# Patient Record
Sex: FEMALE | Race: WHITE | NOT HISPANIC OR LATINO | Employment: OTHER | URBAN - METROPOLITAN AREA
[De-identification: names, ages, dates, MRNs, and addresses within clinical notes are randomized per-mention and may not be internally consistent; named-entity substitution may affect disease eponyms.]

---

## 2017-05-24 ENCOUNTER — OFFICE VISIT (OUTPATIENT)
Dept: OBSTETRICS AND GYNECOLOGY | Facility: CLINIC | Age: 72
End: 2017-05-24

## 2017-05-24 VITALS
DIASTOLIC BLOOD PRESSURE: 78 MMHG | BODY MASS INDEX: 22.57 KG/M2 | SYSTOLIC BLOOD PRESSURE: 124 MMHG | WEIGHT: 132.2 LBS | HEIGHT: 64 IN

## 2017-05-24 DIAGNOSIS — Z01.419 WELL FEMALE EXAM WITH ROUTINE GYNECOLOGICAL EXAM: ICD-10-CM

## 2017-05-24 DIAGNOSIS — Z78.0 POSTMENOPAUSAL: Primary | ICD-10-CM

## 2017-05-24 DIAGNOSIS — N95.2 ATROPHIC VAGINITIS: ICD-10-CM

## 2017-05-24 PROCEDURE — 99397 PER PM REEVAL EST PAT 65+ YR: CPT | Performed by: OBSTETRICS & GYNECOLOGY

## 2017-05-26 LAB
CONV .: NORMAL
CYTOLOGIST CVX/VAG CYTO: NORMAL
CYTOLOGY CVX/VAG DOC THIN PREP: NORMAL
DX ICD CODE: NORMAL
HIV 1 & 2 AB SER-IMP: NORMAL
OTHER STN SPEC: NORMAL
PATH REPORT.FINAL DX SPEC: NORMAL
STAT OF ADQ CVX/VAG CYTO-IMP: NORMAL

## 2019-06-13 ENCOUNTER — OFFICE VISIT (OUTPATIENT)
Dept: INTERNAL MEDICINE | Facility: CLINIC | Age: 74
End: 2019-06-13

## 2019-06-13 VITALS
BODY MASS INDEX: 22.93 KG/M2 | HEIGHT: 64 IN | TEMPERATURE: 97.7 F | SYSTOLIC BLOOD PRESSURE: 118 MMHG | HEART RATE: 79 BPM | OXYGEN SATURATION: 98 % | WEIGHT: 134.3 LBS | DIASTOLIC BLOOD PRESSURE: 78 MMHG

## 2019-06-13 DIAGNOSIS — Z12.11 SCREENING FOR COLON CANCER: ICD-10-CM

## 2019-06-13 DIAGNOSIS — E78.5 HYPERLIPIDEMIA, UNSPECIFIED HYPERLIPIDEMIA TYPE: ICD-10-CM

## 2019-06-13 DIAGNOSIS — Z00.00 HEALTHCARE MAINTENANCE: Primary | ICD-10-CM

## 2019-06-13 DIAGNOSIS — R73.9 ELEVATED BLOOD SUGAR: ICD-10-CM

## 2019-06-13 PROBLEM — R42 VERTIGO: Status: ACTIVE | Noted: 2019-06-13

## 2019-06-13 PROBLEM — N39.0 URINARY TRACT INFECTION: Status: ACTIVE | Noted: 2019-06-13

## 2019-06-13 PROBLEM — R30.0 DYSURIA: Status: ACTIVE | Noted: 2019-06-13

## 2019-06-13 PROBLEM — J30.9 ATOPIC RHINITIS: Status: ACTIVE | Noted: 2019-06-13

## 2019-06-13 PROCEDURE — G0439 PPPS, SUBSEQ VISIT: HCPCS | Performed by: NURSE PRACTITIONER

## 2019-06-13 RX ORDER — CETIRIZINE HYDROCHLORIDE 10 MG/1
10 TABLET ORAL DAILY
COMMUNITY

## 2019-06-13 RX ORDER — PRAVASTATIN SODIUM 40 MG
TABLET ORAL
COMMUNITY
Start: 2019-06-03 | End: 2019-06-13 | Stop reason: SDUPTHER

## 2019-06-13 RX ORDER — PRAVASTATIN SODIUM 40 MG
40 TABLET ORAL NIGHTLY
Qty: 90 TABLET | Refills: 3 | Status: SHIPPED | OUTPATIENT
Start: 2019-06-13

## 2019-06-13 RX ORDER — ANTACID TABLETS 500 MG/1
600 TABLET, CHEWABLE ORAL DAILY
COMMUNITY

## 2019-06-13 NOTE — PROGRESS NOTES
"Subjective   Courtney España is a 73 y.o. female and is here for a comprehensive physical exam. New patient here to establish care.  Health history and questionnaire have been reviewed in its entirety. The patient's previous primary care provider was Dr. Cueva in 2015 and then a provider at [facility].  Her  live in [place] 6 months out of the year, so she does have a physician in [place] also.  The patient reports no problems.    Patient has a history of hyperlipidemia.  She is currently on pravastatin 40 mg and takes that about 3 days a week.  She has had side effects from other statins in the past, including Crestor.  Taking pravastatin 3 days a week does not seem to cause her any muscle aches.  She and her  are also going to be starting a plant-based diet to help improve her cholesterol.    Do you take any herbs or supplements that were not prescribed by a doctor? calcium and multivitamin     History:  Patient see gynecology and is up to date with pap and mammogram.     The following portions of the patient's history were reviewed and updated as appropriate: allergies, current medications, past family history, past medical history, past social history, past surgical history and problem list.    Review of Systems  Do you have pain that bothers you in your daily life? no  A comprehensive review of systems was negative.    Objective   /78 (BP Location: Left arm, Patient Position: Sitting, Cuff Size: Adult)   Pulse 79   Temp 97.7 °F (36.5 °C) (Oral)   Ht 162.6 cm (64\")   Wt 60.9 kg (134 lb 4.8 oz)   SpO2 98%   BMI 23.05 kg/m²     General Appearance:    Alert, cooperative, no distress, appears stated age   Head:    Normocephalic, without obvious abnormality, atraumatic   Eyes:    PERRL, conjunctiva/corneas clear, EOM's intact, both eyes   Ears:    Normal TM's and external ear canals, both ears   Nose:   Nares normal, septum midline, mucosa normal, no drainage    or sinus tenderness   Throat:   " Lips, mucosa, and tongue normal; teeth and gums normal   Neck:   Supple, symmetrical, trachea midline, no adenopathy;     thyroid:  no enlargement/tenderness/nodules; no carotid    bruit   Back:     Symmetric, no curvature, ROM normal, no CVA tenderness   Lungs:     Clear to auscultation bilaterally, respirations unlabored   Chest Wall:    No tenderness or deformity    Heart:    Regular rate and rhythm, S1 and S2 normal, no murmur       Abdomen:     Soft, non-tender, bowel sounds active all four quadrants,     no masses, no organomegaly           Extremities:   Extremities normal, atraumatic, no cyanosis or edema   Pulses:   2+ and symmetric all extremities   Skin:   Skin color, texture, turgor normal, no rashes or lesions   Lymph nodes:   Cervical, supraclavicular, and axillary nodes normal   Neurologic:   Grossly intact, normal strength, sensation and reflexes     throughout     ECG 12 Lead  Date/Time: 6/13/2019 9:15 AM  Performed by: Delmi Beasley APRN  Authorized by: Delmi Beasley APRN   Comparison: not compared with previous ECG   Previous ECG: no previous ECG available  Rhythm: sinus rhythm  Rate: normal  BPM: 81  Conduction: conduction normal  ST Segments: ST segments normal  T Waves: T waves normal  QRS axis: normal  Other: no other findings    Clinical impression: normal ECG  Comments: CA interval 180 ms  QRS interval 84 ms  QTc interval 432 ms                 Assessment/Plan   Healthy female exam.      1Bruce Valdez was seen today for annual exam.    Diagnoses and all orders for this visit:    Healthcare maintenance  -     ECG 12 Lead    Screening for colon cancer  -     Ambulatory Referral For Screening Colonoscopy    Hyperlipidemia, unspecified hyperlipidemia type  -     Comprehensive Metabolic Panel  -     Lipid Panel With / Chol / HDL Ratio  -     ECG 12 Lead  -     pravastatin (PRAVACHOL) 40 MG tablet; Take 1 tablet by mouth Every Night.    Elevated blood sugar  -     Comprehensive Metabolic  Panel  -     Hemoglobin A1c    Will call patient with lab results. She has had an elevated glucose in the past, so will check HgbA1C.    2. Patient Counseling:  --Nutrition: Stressed importance of moderation in sodium/caffeine intake, saturated fat and cholesterol, caloric balance, sufficient intake of fresh fruits, vegetables, fiber, calcium, iron. Starting plant based diet.  --Exercise: Stressed the importance of regular exercise. Walks twice a week; [facility] to [employer]  --Injury prevention: Discussed safety belts, safety helmets, smoke detector.   --Dental health: Discussed importance of regular tooth brushing, flossing, and dental visits.  --Immunizations reviewed.    3. Discussed the patient's BMI with her.  The BMI is in the acceptable range  4. Follow up next physical in 1 year

## 2019-06-14 LAB
ALBUMIN SERPL-MCNC: 4.8 G/DL (ref 3.5–5.2)
ALBUMIN/GLOB SERPL: 1.9 G/DL
ALP SERPL-CCNC: 64 U/L (ref 39–117)
ALT SERPL-CCNC: 16 U/L (ref 1–33)
AST SERPL-CCNC: 21 U/L (ref 1–32)
BILIRUB SERPL-MCNC: 0.6 MG/DL (ref 0.2–1.2)
BUN SERPL-MCNC: 12 MG/DL (ref 8–23)
BUN/CREAT SERPL: 16.9 (ref 7–25)
CALCIUM SERPL-MCNC: 9.8 MG/DL (ref 8.6–10.5)
CHLORIDE SERPL-SCNC: 101 MMOL/L (ref 98–107)
CHOLEST SERPL-MCNC: 218 MG/DL (ref 0–200)
CHOLEST/HDLC SERPL: 4.45 {RATIO}
CO2 SERPL-SCNC: 28.9 MMOL/L (ref 22–29)
CREAT SERPL-MCNC: 0.71 MG/DL (ref 0.57–1)
GLOBULIN SER CALC-MCNC: 2.5 GM/DL
GLUCOSE SERPL-MCNC: 90 MG/DL (ref 65–99)
HBA1C MFR BLD: 5.5 % (ref 4.8–5.6)
HDLC SERPL-MCNC: 49 MG/DL (ref 40–60)
LDLC SERPL CALC-MCNC: 118 MG/DL (ref 0–100)
POTASSIUM SERPL-SCNC: 4.6 MMOL/L (ref 3.5–5.2)
PROT SERPL-MCNC: 7.3 G/DL (ref 6–8.5)
SODIUM SERPL-SCNC: 142 MMOL/L (ref 136–145)
TRIGL SERPL-MCNC: 253 MG/DL (ref 0–150)
VLDLC SERPL CALC-MCNC: 50.6 MG/DL

## 2019-06-25 ENCOUNTER — PREP FOR SURGERY (OUTPATIENT)
Dept: OTHER | Facility: HOSPITAL | Age: 74
End: 2019-06-25

## 2019-06-25 DIAGNOSIS — Z80.0 FAMILY HISTORY OF COLON CANCER: Primary | ICD-10-CM

## 2019-07-11 ENCOUNTER — OFFICE VISIT (OUTPATIENT)
Dept: OBSTETRICS AND GYNECOLOGY | Facility: CLINIC | Age: 74
End: 2019-07-11

## 2019-07-11 VITALS
DIASTOLIC BLOOD PRESSURE: 70 MMHG | WEIGHT: 132 LBS | HEIGHT: 64 IN | BODY MASS INDEX: 22.53 KG/M2 | SYSTOLIC BLOOD PRESSURE: 118 MMHG

## 2019-07-11 DIAGNOSIS — Z11.51 SPECIAL SCREENING EXAMINATION FOR HUMAN PAPILLOMAVIRUS (HPV): ICD-10-CM

## 2019-07-11 DIAGNOSIS — Z01.419 WELL WOMAN EXAM WITH ROUTINE GYNECOLOGICAL EXAM: Primary | ICD-10-CM

## 2019-07-11 LAB
BILIRUB BLD-MCNC: NEGATIVE MG/DL
CLARITY, POC: CLEAR
COLOR UR: YELLOW
GLUCOSE UR STRIP-MCNC: NEGATIVE MG/DL
KETONES UR QL: NEGATIVE
LEUKOCYTE EST, POC: NEGATIVE
NITRITE UR-MCNC: NEGATIVE MG/ML
PH UR: 5 [PH] (ref 5–8)
PROT UR STRIP-MCNC: NEGATIVE MG/DL
RBC # UR STRIP: NEGATIVE /UL
SP GR UR: 1 (ref 1–1.03)
UROBILINOGEN UR QL: NORMAL

## 2019-07-11 PROCEDURE — 99397 PER PM REEVAL EST PAT 65+ YR: CPT | Performed by: OBSTETRICS & GYNECOLOGY

## 2019-07-11 NOTE — PROGRESS NOTES
GYN Annual Exam     CC- Here for annual exam.     Courtney España is a 73 y.o. female who presents for annual well woman exam. Periods are absent.     OB History     No data available          Current contraception: post menopausal status  History of abnormal Pap smear: no  Family history of uterine, colon or ovarian cancer: no  History of abnormal mammogram: no  Family history of breast cancer: yes - MMG normal  Last Pap : 2016    Past Medical History:   Diagnosis Date   • Acute otitis media    • Encounter for screening colonoscopy     (Fiberoptic)   • Encounter for screening for human papillomavirus (HPV)    • Encounter for screening for osteoporosis    • H/O Doppler echocardiogram    • Low back pain    • Osteopenia    • Prediabetes    • Visit for screening mammogram        Past Surgical History:   Procedure Laterality Date   • APPENDECTOMY           Current Outpatient Medications:   •  Calcium Carbonate 500 MG chewable tablet, Chew 600 mg Daily., Disp: , Rfl:   •  cetirizine (zyrTEC) 10 MG tablet, Take 10 mg by mouth Daily., Disp: , Rfl:   •  Multiple Vitamin tablet, Take  by mouth., Disp: , Rfl:   •  pravastatin (PRAVACHOL) 40 MG tablet, Take 1 tablet by mouth Every Night., Disp: 90 tablet, Rfl: 3    No Known Allergies    Social History     Tobacco Use   • Smoking status: Never Smoker   • Smokeless tobacco: Never Used   Substance Use Topics   • Alcohol use: Yes     Frequency: 2-3 times a week     Comment: 2-3 times a week   • Drug use: No       Family History   Problem Relation Age of Onset   • Cancer Mother         Breast, Colon   • Hypertension Father    • Cancer Father         Lung Cancer   • Cancer Sister         Breast       Review of Systems   Constitutional: Negative for appetite change, fever and unexpected weight change.   HENT: Negative for congestion and sore throat.    Respiratory: Negative for cough and shortness of breath.    Cardiovascular: Negative for chest pain and palpitations.  "  Gastrointestinal: Negative for abdominal distention, abdominal pain, constipation, diarrhea, nausea and vomiting.   Endocrine: Negative.    Genitourinary: Negative for dyspareunia, menstrual problem, pelvic pain and vaginal discharge.   Skin: Negative.    Neurological: Negative for dizziness and syncope.   Hematological: Negative.    Psychiatric/Behavioral: Negative for dysphoric mood and sleep disturbance. The patient is not nervous/anxious.        /70   Ht 162.6 cm (64\")   Wt 59.9 kg (132 lb)   BMI 22.66 kg/m²     Physical Exam   Constitutional: She is oriented to person, place, and time. She appears well-developed and well-nourished.   HENT:   Head: Normocephalic and atraumatic.   Neck: Normal range of motion. Neck supple. No thyromegaly present.   Cardiovascular: Normal rate and regular rhythm.   Pulmonary/Chest: Effort normal and breath sounds normal. Right breast exhibits no mass and no nipple discharge. Left breast exhibits no mass and no nipple discharge. Breasts are symmetrical. There is no breast swelling.   Abdominal: Soft. Bowel sounds are normal. She exhibits no distension and no mass. There is no tenderness. There is no rebound and no guarding.   Genitourinary: Vagina normal and uterus normal. No breast tenderness, discharge or bleeding. Pelvic exam was performed with patient prone. There is no lesion on the right labia. There is no lesion on the left labia. Cervix exhibits no motion tenderness and no discharge. Right adnexum displays no mass. Left adnexum displays no mass.   Musculoskeletal: Normal range of motion. She exhibits no edema.   Neurological: She is alert and oriented to person, place, and time.   Skin: Skin is warm and dry.   Psychiatric: She has a normal mood and affect. Her behavior is normal. Judgment and thought content normal.   Nursing note and vitals reviewed.      Diagnoses and all orders for this visit:    Well woman exam with routine gynecological exam  -     POC " Urinalysis Dipstick        Assessment     1) GYN annual well woman exam.   2) MMG done in [place]     Plan     1) Breast Health - Clinical breast exam & mammogram yearly, Self breast awareness monthly  2) Pap - done today  3) Smoking status- Never smoker  4) Colon health - screening colonoscopy recommended if not up to date  5) Bone health - Weight bearing exercise, dietary calcium recommendations and vitamin D reviewed.   6) Seat belts recommended  7) Follow up prn and one year    Encounter Diagnoses   Name Primary?   • Well woman exam with routine gynecological exam Yes         Getachew Burnham MD  7/11/2019  10:23 AM

## 2019-07-12 PROBLEM — Z80.0 FAMILY HISTORY OF COLON CANCER: Status: ACTIVE | Noted: 2019-07-12

## 2019-07-15 LAB
CYTOLOGIST CVX/VAG CYTO: NORMAL
CYTOLOGY CVX/VAG DOC CYTO: NORMAL
CYTOLOGY CVX/VAG DOC THIN PREP: NORMAL
DX ICD CODE: NORMAL
HIV 1 & 2 AB SER-IMP: NORMAL
OTHER STN SPEC: NORMAL
STAT OF ADQ CVX/VAG CYTO-IMP: NORMAL

## 2019-07-22 ENCOUNTER — TELEPHONE (OUTPATIENT)
Dept: INTERNAL MEDICINE | Facility: CLINIC | Age: 74
End: 2019-07-22

## 2019-07-22 RX ORDER — SCOLOPAMINE TRANSDERMAL SYSTEM 1 MG/1
1 PATCH, EXTENDED RELEASE TRANSDERMAL
Qty: 4 EACH | Refills: 0 | Status: SHIPPED | OUTPATIENT
Start: 2019-07-22

## 2019-07-22 NOTE — TELEPHONE ENCOUNTER
----- Message from Nasrin Barr MA sent at 7/22/2019 10:05 AM EDT -----  Regarding: FW: MED REQUEST  Please advise.     ----- Message -----  From: Carmen Guerra  Sent: 7/22/2019   9:46 AM  To: Nasrin Barr MA  Subject: MED REQUEST                                      Patient is going on a cruise in August and wants to get a script called in for the patch for motion sickness. She will be gone 7 days. She would like to have the pharmacy call her when it is ready because last time they were out of it and didn't let her know until she went to pick it up. Thanks

## 2019-09-25 ENCOUNTER — HOSPITAL ENCOUNTER (OUTPATIENT)
Facility: HOSPITAL | Age: 74
Setting detail: HOSPITAL OUTPATIENT SURGERY
Discharge: HOME | End: 2019-09-25
Attending: SURGERY | Admitting: SURGERY

## 2019-09-25 ENCOUNTER — ANESTHESIA EVENT (OUTPATIENT)
Dept: GASTROENTEROLOGY | Facility: HOSPITAL | Age: 74
End: 2019-09-25

## 2019-09-25 ENCOUNTER — ANESTHESIA (OUTPATIENT)
Dept: GASTROENTEROLOGY | Facility: HOSPITAL | Age: 74
End: 2019-09-25

## 2019-09-25 VITALS
DIASTOLIC BLOOD PRESSURE: 79 MMHG | BODY MASS INDEX: 20.71 KG/M2 | WEIGHT: 124.31 LBS | HEIGHT: 65 IN | RESPIRATION RATE: 16 BRPM | TEMPERATURE: 98.5 F | HEART RATE: 97 BPM | SYSTOLIC BLOOD PRESSURE: 116 MMHG | OXYGEN SATURATION: 97 %

## 2019-09-25 PROCEDURE — 25010000002 PROPOFOL 10 MG/ML EMULSION: Performed by: ANESTHESIOLOGY

## 2019-09-25 PROCEDURE — G0105 COLORECTAL SCRN; HI RISK IND: HCPCS | Performed by: SURGERY

## 2019-09-25 PROCEDURE — 25010000002 FENTANYL CITRATE (PF) 100 MCG/2ML SOLUTION: Performed by: ANESTHESIOLOGY

## 2019-09-25 RX ORDER — PROPOFOL 10 MG/ML
VIAL (ML) INTRAVENOUS AS NEEDED
Status: DISCONTINUED | OUTPATIENT
Start: 2019-09-25 | End: 2019-09-25 | Stop reason: SURG

## 2019-09-25 RX ORDER — FENTANYL CITRATE 50 UG/ML
INJECTION, SOLUTION INTRAMUSCULAR; INTRAVENOUS AS NEEDED
Status: DISCONTINUED | OUTPATIENT
Start: 2019-09-25 | End: 2019-09-25 | Stop reason: SURG

## 2019-09-25 RX ORDER — GLYCOPYRROLATE 0.2 MG/ML
INJECTION INTRAMUSCULAR; INTRAVENOUS AS NEEDED
Status: DISCONTINUED | OUTPATIENT
Start: 2019-09-25 | End: 2019-09-25 | Stop reason: SURG

## 2019-09-25 RX ORDER — SODIUM CHLORIDE, SODIUM LACTATE, POTASSIUM CHLORIDE, CALCIUM CHLORIDE 600; 310; 30; 20 MG/100ML; MG/100ML; MG/100ML; MG/100ML
30 INJECTION, SOLUTION INTRAVENOUS CONTINUOUS PRN
Status: DISCONTINUED | OUTPATIENT
Start: 2019-09-25 | End: 2019-09-25 | Stop reason: HOSPADM

## 2019-09-25 RX ORDER — SODIUM CHLORIDE 0.9 % (FLUSH) 0.9 %
10 SYRINGE (ML) INJECTION AS NEEDED
Status: DISCONTINUED | OUTPATIENT
Start: 2019-09-25 | End: 2019-09-25 | Stop reason: HOSPADM

## 2019-09-25 RX ORDER — LIDOCAINE HYDROCHLORIDE 20 MG/ML
INJECTION, SOLUTION INFILTRATION; PERINEURAL AS NEEDED
Status: DISCONTINUED | OUTPATIENT
Start: 2019-09-25 | End: 2019-09-25 | Stop reason: SURG

## 2019-09-25 RX ORDER — PROPOFOL 10 MG/ML
VIAL (ML) INTRAVENOUS CONTINUOUS PRN
Status: DISCONTINUED | OUTPATIENT
Start: 2019-09-25 | End: 2019-09-25 | Stop reason: SURG

## 2019-09-25 RX ADMIN — SODIUM CHLORIDE, POTASSIUM CHLORIDE, SODIUM LACTATE AND CALCIUM CHLORIDE 30 ML/HR: 600; 310; 30; 20 INJECTION, SOLUTION INTRAVENOUS at 09:55

## 2019-09-25 RX ADMIN — FENTANYL CITRATE 25 MCG: 50 INJECTION INTRAMUSCULAR; INTRAVENOUS at 10:35

## 2019-09-25 RX ADMIN — FENTANYL CITRATE 25 MCG: 50 INJECTION INTRAMUSCULAR; INTRAVENOUS at 10:29

## 2019-09-25 RX ADMIN — LIDOCAINE HYDROCHLORIDE 60 MG: 20 INJECTION, SOLUTION INFILTRATION; PERINEURAL at 10:29

## 2019-09-25 RX ADMIN — PROPOFOL 160 MCG/KG/MIN: 10 INJECTION, EMULSION INTRAVENOUS at 10:29

## 2019-09-25 RX ADMIN — GLYCOPYRROLATE 0.2 MG: 0.2 INJECTION INTRAMUSCULAR; INTRAVENOUS at 10:21

## 2019-09-25 RX ADMIN — PROPOFOL 80 MG: 10 INJECTION, EMULSION INTRAVENOUS at 10:29

## 2019-09-25 NOTE — ANESTHESIA POSTPROCEDURE EVALUATION
"Patient: Courtney España    Procedure Summary     Date:  09/25/19 Room / Location:  [facility] ENDOSCOPY 1 /  [facility]    Anesthesia Start:  1023 Anesthesia Stop:      Procedure:  COLONOSCOPY (N/A ) Diagnosis:       Family history of colon cancer      (Family history of colon cancer [Z80.0])    Surgeon:  Charlie Virgen MD Provider:  Justin Rayo MD    Anesthesia Type:  MAC ASA Status:  2          Anesthesia Type: MAC  Last vitals  BP   124/80 (09/25/19 0942)   Temp   36.9 °C (98.5 °F) (09/25/19 0942)   Pulse   88 (09/25/19 0942)   Resp   16 (09/25/19 0942)     SpO2   97 % (09/25/19 0942)     Post Anesthesia Care and Evaluation    Patient location during evaluation: PHASE II  Patient participation: complete - patient participated  Level of consciousness: lethargic  Pain management: adequate  Airway patency: patent  Anesthetic complications: No anesthetic complications    Cardiovascular status: acceptable  Respiratory status: acceptable  Hydration status: acceptable    Comments: /80 (BP Location: Left arm, Patient Position: Sitting)   Pulse 88   Temp 36.9 °C (98.5 °F) (Oral)   Resp 16   Ht 165.1 cm (65\")   Wt 56.4 kg (124 lb 5 oz)   SpO2 97%   BMI 20.69 kg/m²         "

## 2019-09-25 NOTE — ANESTHESIA PREPROCEDURE EVALUATION
Anesthesia Evaluation     Patient summary reviewed and Nursing notes reviewed   NPO Solid Status: > 8 hours  NPO Liquid Status: > 2 hours           Airway   Mallampati: II  TM distance: >3 FB  Neck ROM: full  Dental - normal exam     Pulmonary - negative pulmonary ROS and normal exam    breath sounds clear to auscultation  Cardiovascular - normal exam    Rhythm: regular  Rate: normal    (+) hyperlipidemia,   (-) angina, orthopnea, PND, ALLEN      Neuro/Psych  (+) dizziness/light headedness,     GI/Hepatic/Renal/Endo - negative ROS     Musculoskeletal     Abdominal    Substance History - negative use     OB/GYN negative ob/gyn ROS         Other   (+) arthritis                     Anesthesia Plan    ASA 2     MAC     Anesthetic plan, all risks, benefits, and alternatives have been provided, discussed and informed consent has been obtained with: patient.

## 2019-09-26 PROBLEM — K57.90 DIVERTICULOSIS: Status: ACTIVE | Noted: 2019-09-26

## 2020-06-05 DIAGNOSIS — Z00.00 HEALTHCARE MAINTENANCE: Primary | ICD-10-CM

## 2020-06-05 DIAGNOSIS — E78.5 HYPERLIPIDEMIA, UNSPECIFIED HYPERLIPIDEMIA TYPE: ICD-10-CM

## 2021-01-12 ENCOUNTER — IMMUNIZATION (OUTPATIENT)
Dept: VACCINE CLINIC | Facility: HOSPITAL | Age: 76
End: 2021-01-12

## 2021-01-12 PROCEDURE — 91300 HC SARSCOV02 VAC 30MCG/0.3ML IM: CPT | Performed by: INTERNAL MEDICINE

## 2021-01-12 PROCEDURE — 0001A: CPT | Performed by: INTERNAL MEDICINE

## 2021-02-02 ENCOUNTER — TELEPHONE (OUTPATIENT)
Dept: INTERNAL MEDICINE | Facility: CLINIC | Age: 76
End: 2021-02-02

## 2021-02-02 ENCOUNTER — IMMUNIZATION (OUTPATIENT)
Dept: VACCINE CLINIC | Facility: HOSPITAL | Age: 76
End: 2021-02-02

## 2021-02-02 DIAGNOSIS — Z12.31 ENCOUNTER FOR SCREENING MAMMOGRAM FOR MALIGNANT NEOPLASM OF BREAST: Primary | ICD-10-CM

## 2021-02-02 PROCEDURE — 91300 HC SARSCOV02 VAC 30MCG/0.3ML IM: CPT | Performed by: INTERNAL MEDICINE

## 2021-02-02 PROCEDURE — 0002A: CPT | Performed by: INTERNAL MEDICINE

## 2021-02-02 NOTE — TELEPHONE ENCOUNTER
Caller: Courtney España    Relationship: Self    Best call back number: [phone]    What orders are you requesting (i.e. lab or imaging): IMAGING    In what timeframe would the patient need to come in: ASAP    Where will you receive your lab/imaging services: MAMMOGRAPHY    Additional notes: PATIENT ASKED IF DR. JAMIL COULD CALL ABOUT A MAMMOGRAM REFERRAL

## 2021-02-05 ENCOUNTER — HOSPITAL ENCOUNTER (OUTPATIENT)
Dept: MAMMOGRAPHY | Facility: HOSPITAL | Age: 76
Discharge: HOME | End: 2021-02-05
Admitting: NURSE PRACTITIONER

## 2021-02-05 DIAGNOSIS — Z12.31 ENCOUNTER FOR SCREENING MAMMOGRAM FOR MALIGNANT NEOPLASM OF BREAST: ICD-10-CM

## 2021-02-05 PROCEDURE — 77067 SCR MAMMO BI INCL CAD: CPT

## 2021-02-05 PROCEDURE — 77063 BREAST TOMOSYNTHESIS BI: CPT

## 2021-02-15 ENCOUNTER — TRANSCRIBE ORDERS (OUTPATIENT)
Dept: ADMINISTRATIVE | Facility: HOSPITAL | Age: 76
End: 2021-02-15

## 2021-02-15 DIAGNOSIS — Z12.31 VISIT FOR SCREENING MAMMOGRAM: Primary | ICD-10-CM

## 2021-02-18 NOTE — PROGRESS NOTES
I am going to have scheduling call her with the mix up on her being scheduled with dr roach and switch her back over. What do I need to tell her regarding this mammo?

## 2021-02-24 ENCOUNTER — TELEPHONE (OUTPATIENT)
Dept: INTERNAL MEDICINE | Facility: CLINIC | Age: 76
End: 2021-02-24

## 2021-02-24 DIAGNOSIS — R92.8 ABNORMAL MAMMOGRAM: Primary | ICD-10-CM

## 2021-02-24 NOTE — TELEPHONE ENCOUNTER
Diagnostic mammogram and ultrasound ordered      ----- Message from Linda Cosby Rep sent at 2/24/2021 12:02 PM EST -----  Regarding: ORDER  You or Dr. Canseco put an Order in for screening mammogram.  Scheduling just called and said that is supposed to be for a diagnostic as there was apparently a spot seen on her mammogram.  I asked if it was right, left or BL, and she wasn't sure.  lol

## 2021-03-01 ENCOUNTER — TELEPHONE (OUTPATIENT)
Dept: INTERNAL MEDICINE | Facility: CLINIC | Age: 76
End: 2021-03-01

## 2021-03-01 NOTE — TELEPHONE ENCOUNTER
----- Message from Linda Cosby sent at 3/1/2021 11:46 AM EST -----  Regarding: ORDER  Scheduling called.  Said two mammogram Orders were put in, one for a 2D and one for a 3D.  Can you cancel the Order for the 2D?  I don't have that access.  Thanks.

## 2021-03-03 ENCOUNTER — APPOINTMENT (OUTPATIENT)
Dept: MAMMOGRAPHY | Facility: HOSPITAL | Age: 76
End: 2021-03-03

## 2021-03-11 ENCOUNTER — OFFICE VISIT (OUTPATIENT)
Dept: INTERNAL MEDICINE | Facility: CLINIC | Age: 76
End: 2021-03-11

## 2021-03-11 VITALS
OXYGEN SATURATION: 99 % | DIASTOLIC BLOOD PRESSURE: 62 MMHG | BODY MASS INDEX: 22.36 KG/M2 | WEIGHT: 131 LBS | HEIGHT: 64 IN | SYSTOLIC BLOOD PRESSURE: 122 MMHG | HEART RATE: 67 BPM

## 2021-03-11 DIAGNOSIS — Z00.00 MEDICARE ANNUAL WELLNESS VISIT, SUBSEQUENT: Primary | ICD-10-CM

## 2021-03-11 DIAGNOSIS — Z87.440 HISTORY OF RECURRENT UTIS: ICD-10-CM

## 2021-03-11 DIAGNOSIS — E78.5 HYPERLIPIDEMIA, UNSPECIFIED HYPERLIPIDEMIA TYPE: ICD-10-CM

## 2021-03-11 DIAGNOSIS — Z78.0 MENOPAUSE: ICD-10-CM

## 2021-03-11 DIAGNOSIS — Z11.59 ENCOUNTER FOR HEPATITIS C SCREENING TEST FOR LOW RISK PATIENT: ICD-10-CM

## 2021-03-11 PROCEDURE — G0439 PPPS, SUBSEQ VISIT: HCPCS | Performed by: NURSE PRACTITIONER

## 2021-03-11 PROCEDURE — 99213 OFFICE O/P EST LOW 20 MIN: CPT | Performed by: NURSE PRACTITIONER

## 2021-03-11 PROCEDURE — 96160 PT-FOCUSED HLTH RISK ASSMT: CPT | Performed by: NURSE PRACTITIONER

## 2021-03-11 PROCEDURE — 1159F MED LIST DOCD IN RCRD: CPT | Performed by: NURSE PRACTITIONER

## 2021-03-11 PROCEDURE — 81003 URINALYSIS AUTO W/O SCOPE: CPT | Performed by: NURSE PRACTITIONER

## 2021-03-11 PROCEDURE — 1170F FXNL STATUS ASSESSED: CPT | Performed by: NURSE PRACTITIONER

## 2021-03-11 NOTE — PROGRESS NOTES
The ABCs of the Annual Wellness Visit  Subsequent Medicare Wellness Visit    Chief Complaint   Patient presents with   • Annual Exam     Pt here for yearly medicare wellness.        Subjective   History of Present Illness:  Courtney España is a 75 y.o. female who presents for a Subsequent Medicare Wellness Visit.    Patient is here to follow up on hyperlipidemia and is taking medication every other day due to muscle aches. Taking medication every other day helps her symptoms    HEALTH RISK ASSESSMENT    Recent Hospitalizations:  No hospitalization(s) within the last year.    Current Medical Providers:  Patient Care Team:  Delmi Beasley APRN as PCP - General (Family Medicine)    Smoking Status:  Social History     Tobacco Use   Smoking Status Never Smoker   Smokeless Tobacco Never Used       Alcohol Consumption:  Social History     Substance and Sexual Activity   Alcohol Use Yes    Comment: 2-3 times a week       Depression Screen:   PHQ-2/PHQ-9 Depression Screening 3/11/2021   Little interest or pleasure in doing things 0   Feeling down, depressed, or hopeless 0   Trouble falling or staying asleep, or sleeping too much 1   Feeling tired or having little energy 1   Poor appetite or overeating 0   Feeling bad about yourself - or that you are a failure or have let yourself or your family down 0   Trouble concentrating on things, such as reading the newspaper or watching television 0   Moving or speaking so slowly that other people could have noticed. Or the opposite - being so fidgety or restless that you have been moving around a lot more than usual 0   Thoughts that you would be better off dead, or of hurting yourself in some way 0   Total Score 2   If you checked off any problems, how difficult have these problems made it for you to do your work, take care of things at home, or get along with other people? Not difficult at all       Fall Risk Screen:  STEADI Fall Risk Assessment was completed, and patient is at LOW  risk for falls.Assessment completed on:3/11/2021    Health Habits and Functional and Cognitive Screening:  Functional & Cognitive Status 3/11/2021   Do you have difficulty preparing food and eating? No   Do you have difficulty bathing yourself, getting dressed or grooming yourself? No   Do you have difficulty using the toilet? No   Do you have difficulty moving around from place to place? No   Do you have trouble with steps or getting out of a bed or a chair? No   Current Diet Well Balanced Diet   Dental Exam Up to date   Eye Exam Up to date   Exercise (times per week) 4 times per week   Current Exercises Include Walking   Do you need help using the phone?  No   Are you deaf or do you have serious difficulty hearing?  Yes   Do you need help with transportation? No   Do you need help shopping? No   Do you need help preparing meals?  No   Do you need help with housework?  No   Do you need help with laundry? No   Do you need help taking your medications? No   Do you need help managing money? No   Do you ever drive or ride in a car without wearing a seat belt? No   Have you felt unusual stress, anger or loneliness in the last month? No   Who do you live with? Spouse   If you need help, do you have trouble finding someone available to you? No   Have you been bothered in the last four weeks by sexual problems? No   Do you have difficulty concentrating, remembering or making decisions? No         Does the patient have evidence of cognitive impairment? No    Asprin use counseling:Does not need ASA (and currently is not on it)    Age-appropriate Screening Schedule:  Refer to the list below for future screening recommendations based on patient's age, sex and/or medical conditions. Orders for these recommended tests are listed in the plan section. The patient has been provided with a written plan.    Health Maintenance   Topic Date Due   • DXA SCAN  Never done   • TDAP/TD VACCINES (1 - Tdap) 12/10/1964   • LIPID PANEL   06/13/2020   • MAMMOGRAM  02/05/2023   • COLONOSCOPY  09/25/2024   • INFLUENZA VACCINE  Completed   • ZOSTER VACCINE  Completed          The following portions of the patient's history were reviewed and updated as appropriate: allergies, current medications, past family history, past medical history, past social history, past surgical history and problem list.    Outpatient Medications Prior to Visit   Medication Sig Dispense Refill   • Calcium Carbonate 500 MG chewable tablet Chew 600 mg Daily.     • cetirizine (zyrTEC) 10 MG tablet Take 10 mg by mouth Daily.     • Multiple Vitamin tablet Take  by mouth.     • Multiple Vitamins-Minerals (ZINC PO) Take 1 tablet by mouth Daily.     • pravastatin (PRAVACHOL) 40 MG tablet Take 1 tablet by mouth Every Night. 90 tablet 3   • Scopolamine (TRANSDERM-SCOP, 1.5 MG,) 1.5 MG/3DAYS patch Place 1 patch on the skin as directed by provider Every 72 (Seventy-Two) Hours. 4 each 0     No facility-administered medications prior to visit.       Patient Active Problem List   Diagnosis   • Atopic rhinitis   • Dysuria   • Hyperlipidemia   • Knee buckling   • Loose body of left knee   • Medial meniscus tear   • Popliteal cyst   • Primary osteoarthritis of left knee   • Urinary tract infection   • Vertigo   • Family history of colon cancer   • Diverticulosis       Advanced Care Planning:  ACP discussion was held with the patient during this visit. Patient has an advance directive in EMR which is still valid.     Review of Systems    Compared to one year ago, the patient feels her physical health is the same.  Compared to one year ago, the patient feels her mental health is the same.    Reviewed chart for potential of high risk medication in the elderly: not applicable  Reviewed chart for potential of harmful drug interactions in the elderly:not applicable    Objective         Vitals:    03/11/21 0949   BP: 122/62   BP Location: Left arm   Patient Position: Sitting   Cuff Size: Adult  "  Pulse: 67   SpO2: 99%   Weight: 59.4 kg (131 lb)   Height: 162.6 cm (64\")   PainSc: 0-No pain       Body mass index is 22.49 kg/m².  Discussed the patient's BMI with her. The BMI is in the acceptable range.    Physical Exam  Vitals and nursing note reviewed.   Constitutional:       Appearance: Normal appearance. She is well-developed.   HENT:      Right Ear: Tympanic membrane and ear canal normal.      Left Ear: Tympanic membrane and ear canal normal.   Cardiovascular:      Rate and Rhythm: Normal rate and regular rhythm.      Heart sounds: Normal heart sounds.   Pulmonary:      Effort: Pulmonary effort is normal.      Breath sounds: Normal breath sounds.   Skin:     General: Skin is warm and dry.   Neurological:      Mental Status: She is alert and oriented to person, place, and time.   Psychiatric:         Speech: Speech normal.         Behavior: Behavior normal.         Thought Content: Thought content normal.               Assessment/Plan   Medicare Risks and Personalized Health Plan  CMS Preventative Services Quick Reference  Breast Cancer/Mammogram Screening  Diabetic Lab Screening   Osteoprorosis Risk     Mammogram on 2/5/2021 showed asymmetry, so she has a diagnostic mammogram and ultrasound scheduled next week.    The above risks/problems have been discussed with the patient.  Pertinent information has been shared with the patient in the After Visit Summary.  Follow up plans and orders are seen below in the Assessment/Plan Section.    There are no diagnoses linked to this encounter.  Follow Up:  No follow-ups on file.     An After Visit Summary and PPPS were given to the patient.    Last physical was in  March 2020 in [place].  She and her  spend half the time in [place] half time here in [place], but will be staying here in [place] for a while due to her son who has a spinal cord injury    Patient will return for fasting labs.  Follow-up in 1 year    Diagnoses and all orders for this " visit:    1. Medicare annual wellness visit, subsequent (Primary)    2. Hyperlipidemia, unspecified hyperlipidemia type  -     Comprehensive Metabolic Panel; Future  -     Lipid Panel With / Chol / HDL Ratio; Future    3. Encounter for hepatitis C screening test for low risk patient  -     Hepatitis C Antibody; Future    4. Menopause  -     DEXA Bone Density Axial; Future

## 2021-03-17 ENCOUNTER — HOSPITAL ENCOUNTER (OUTPATIENT)
Dept: ULTRASOUND IMAGING | Facility: HOSPITAL | Age: 76
Discharge: HOME | End: 2021-03-17

## 2021-03-17 ENCOUNTER — HOSPITAL ENCOUNTER (OUTPATIENT)
Dept: MAMMOGRAPHY | Facility: HOSPITAL | Age: 76
Discharge: HOME | End: 2021-03-17

## 2021-03-17 DIAGNOSIS — R92.8 ABNORMAL MAMMOGRAM: ICD-10-CM

## 2021-03-17 PROCEDURE — G0279 TOMOSYNTHESIS, MAMMO: HCPCS

## 2021-03-17 PROCEDURE — 77065 DX MAMMO INCL CAD UNI: CPT

## 2021-03-17 PROCEDURE — 76642 ULTRASOUND BREAST LIMITED: CPT

## 2021-03-18 DIAGNOSIS — R92.8 ABNORMAL MAMMOGRAM: Primary | ICD-10-CM

## 2021-03-22 ENCOUNTER — TELEPHONE (OUTPATIENT)
Dept: SURGERY | Facility: CLINIC | Age: 76
End: 2021-03-22

## 2021-03-22 NOTE — TELEPHONE ENCOUNTER
New Pt with Shasha De León NP  4-14-21 arrive 8:00am    Left message for pt to call,    Mailed dharmesh Mcneill

## 2021-03-25 ENCOUNTER — HOSPITAL ENCOUNTER (OUTPATIENT)
Dept: BONE DENSITY | Facility: HOSPITAL | Age: 76
Discharge: HOME | End: 2021-03-25
Admitting: NURSE PRACTITIONER

## 2021-03-25 DIAGNOSIS — Z78.0 MENOPAUSE: ICD-10-CM

## 2021-03-25 PROCEDURE — 77080 DXA BONE DENSITY AXIAL: CPT

## 2021-04-06 NOTE — PROGRESS NOTES
[facility]     Referring Provider: FRIEDA Jo     Chief complaint: abnormal breast imaging     HPI: Ms. Courtney España is a 74 yo woman, seen at the request of FRIEDA Jo, for abnormal left breast imaging.    I personally reviewed her records and summarized her relevant breast history/imaging:    She has no personal history of breast procedures         She has a family history of breast cancer in her mother diagnosed at age 85 with negative genetic testing, sister at age 50.   She denies any family history of ovarian cancer.  Her mother also had colon cancer at age 80.  There may also be a PA with breast cancer, uncertain of age.    2/5/2021:  Screening mammogram with tomosynthesis at [facility]  FINDINGS:  There are scattered areas of fibroglandular density.     There is an asymmetry in the outer posterior left breast on the MLO view. There are no suspicious masses, calcifications, or areas of architectural distortion in the right breast.     IMPRESSION/RECOMMENDATION(S):  Indeterminate left breast asymmetry. Recommend comparison with prior imaging, if available. If prior imaging is not available or if the finding is new, recommend further evaluation with CC spot compression and lateral views with Tomosynthesis and possible ultrasound.  No mammographic evidence of malignancy in the right breast.     BI-RADS Category 0: Incomplete - Need Additional Imaging Evaluation and/or Prior Mammograms for Comparison     3/14/2021:  Left diagnostic mammogram and left US at [facility]  Addendum: Prior mammograms dated 2/27/2019 and 11/15/2016 have become available for comparison.  The focal asymmetry in the upper outer posterior left breast is not significantly changed dating back to 2016 on the CC view and is slightly more conspicuous on the lateral view compared to prior MLO views, however, no comparison lateral view is available. This may be due to differences in patient positioning.  Additionally, no prior Tomosynthesis images are available for comparison. Given the long-term stability on the CC 2-D views, this is probably benign. Recommend short-term follow-up diagnostic left breast mammogram with Tomosynthesis in 6 months.     BI-RADS Category 3: Probably benign.     This report was finalized on 3/19/2021 4:35 PM by Dr. Stephanie Sanchez M.D.      Signed by Stephanie Sanchez MD on 3/19/2021  4:35 PM   Narrative & Impression   MAMMO DIAGNOSTIC DIGITAL TOMOSYNTHESIS LEFT W CAD-, US BREAST LEFT  LIMITED-     CLINICAL INDICATION: 75 years old woman recalled from annual screening mammogram from 2/5/2021 for evaluation of an asymmetry in the outer posterior left breast.     COMPARISON: Prior examinations dating back to 2/5/2021     FINDINGS:  MAMMOGRAM: Left CC spot compression and lateral 2-D and Tomosynthesis views were obtained.       There are scattered areas of fibroglandular density.      There is a persistent 0.9 cm irregular focal asymmetry in the upper outer posterior left breast. This area was further assessed with ultrasound.     ULTRASOUND:  Targeted sonographic evaluation of the left breast was performed from 1:00 to 2:00 in the region of the mammographic abnormality. No suspicious cystic or solid mass is identified.     IMPRESSION:  Suspicious 0.9 cm focal asymmetry in the upper outer posterior left breast without a sonographic correlate. Recommend further evaluation with stereotactic/Tomosynthesis guided core needle biopsy. The patient reports multiple prior mammograms from an outside institution in [place], most recently performed within the last year and a half. If prior imaging becomes available, an addendum will be issued to this report.     Findings and recommendations were discussed with the patient and her  in person at the time of her examination.     BI-RADS Category 4: Suspicious     This report was finalized on 3/17/2021 1:40 PM by Dr. Stephanie Sanchez M.D.         Today she  presents with no breast concerns.  She denies any breast lumps, pain, skin changes, or nipple discharge.  She denies any prior history of abnormal mammograms or breast biopsies.     She reports that she is otherwise healthy and does not take any medications.     She was joined today in clinic by her sister who participated in the discussion with her consent.     Of note, her son passed away this past weekend, she is visibly shaken by the loss.      Review of Systems   HENT:   Positive for hearing loss.    All other systems reviewed and are negative.      Medications:    Current Outpatient Medications:   •  Calcium Carbonate 500 MG chewable tablet, Chew 600 mg Daily., Disp: , Rfl:   •  cetirizine (zyrTEC) 10 MG tablet, Take 10 mg by mouth Daily., Disp: , Rfl:   •  Multiple Vitamin tablet, Take  by mouth., Disp: , Rfl:   •  Multiple Vitamins-Minerals (ZINC PO), Take 1 tablet by mouth Daily., Disp: , Rfl:   •  pravastatin (PRAVACHOL) 40 MG tablet, Take 1 tablet by mouth Every Night., Disp: 90 tablet, Rfl: 3  •  Scopolamine (TRANSDERM-SCOP, 1.5 MG,) 1.5 MG/3DAYS patch, Place 1 patch on the skin as directed by provider Every 72 (Seventy-Two) Hours., Disp: 4 each, Rfl: 0    Allergies:  No Known Allergies    Medical history:  Past Medical History:   Diagnosis Date   • Acute otitis media    • Diverticulosis 09/26/2019   • Encounter for screening colonoscopy     (Fiberoptic)   • Encounter for screening for human papillomavirus (HPV)    • Encounter for screening for osteoporosis    • H/O Doppler echocardiogram    • Low back pain    • Osteopenia    • Prediabetes    • Visit for screening mammogram        Surgical History:  Past Surgical History:   Procedure Laterality Date   • APPENDECTOMY     • COLONOSCOPY N/A 9/25/2019    Procedure: COLONOSCOPY;  Surgeon: Charlie Virgen MD;  Location: [facility] ENDOSCOPY;  Service: General       Family History:  Family History   Problem Relation Age of Onset   • Cancer Mother         Breast,  Colon   • Breast cancer Mother    • Hypertension Father    • Cancer Father         Lung Cancer   • Cancer Sister         Breast   • Breast cancer Sister    • Breast cancer Paternal Aunt    • Breast cancer Paternal Aunt        Social History:   Social History     Socioeconomic History   • Marital status:      Spouse name: Not on file   • Number of children: Not on file   • Years of education: Not on file   • Highest education level: Not on file   Tobacco Use   • Smoking status: Never Smoker   • Smokeless tobacco: Never Used   Substance and Sexual Activity   • Alcohol use: Yes     Comment: 2-3 times a week   • Drug use: No   • Sexual activity: Defer     Patient drinks 2 servings of caffeine per day.       GYNECOLOGIC HISTORY:   G: 3. P: 3. AB: 0.  Last menstrual period: age 50  Age at menarche: 12  Age at first childbirth: 19  Lactation/How long: n/a  Age at menopause: 50  Total years of oral contraceptive use: 10  Total years of hormone replacement therapy: 10      Physical Exam  Vitals:    04/14/21 0757   BP: 129/72   Pulse: 91   SpO2: 97%     ECOG 0 - Asymptomatic  General: NAD, well appearing  Psych: a&o x 3, normal mood and affect  Eyes: EOMI, no scleral icterus  ENMT: neck supple without masses or thyromegaly, mucus membranes moist  Resp: normal effort, CTAB  CV: RRR, no murmurs, no edema   GI: soft, NT, ND  MSK: normal gait, normal ROM in bilateral shoulders  Lymph nodes:  no cervical, supraclavicular or axillary lymphadenopathy  Breast: symmetric  Right:  No visible abnormalities on inspection while seated, with arms raised or hands on hips. No masses, skin changes, or nipple abnormalities.  Left:  No visible abnormalities on inspection while seated, with arms raised or hands on hips. No masses, skin changes, or nipple abnormalities.      Assessment:    1)  75 y.o. F with a new diagnosis of abnormal left breast imaging, surveillance recommended  2)  Fibrocystic breast changes  3)  Family history of  breast cancer, lifetime risk 4.1% per tyrer cuzick model, 17.1% per mckinley model with a 5 year risk of 8.3%    Discussion:  1)  I explained the concept of a BI-RADS 3 designation and the process of imaging surveillance. We discussed that a BI-RADS 3 designation describes an imaging finding that is 98-99% likely to represent a benign process. We discussed that the most common management of a BI-RADS 3 finding is initial 6 month imaging surveillance and that the entire period of imaging surveillance can often take 2 years. She verbalized understanding and is in agreement with this plan.    2)  We discussed fibrocystic change and how this is benign and can sometimes be associated with increased breast density. I reassured her today that she had a normal clinical breast exam and recent normal bilateral imaging. I explained that cords of dense breast tissue or focal areas of fibrocystic change can sometimes feel like a lump on exam. This is common in women like her with heterogeneous and nodular tissue. I encouraged her to become familiar with her own self-exam over the next few months to establish a personal baseline.    3)  Her risk for breast cancer was calculated based on personal and family history and found to be a lifetime risk 4.1% per tyrer cuzick model, 17.1% per mckinley model with a 5 year risk of 8.3%.  Neither of these models reflect high risk ( > 20% lifetime risk ) but that is likely due to her current age of 75.  Her 5 year risk of 8.3% is significant.  We also discussed that for a Mckinley model 5 year risk greater than 1.7% or LCIS, and a life expectancy > 10 years, that we recommend risk reduction counseling with the medical oncologists about chemopreventive agents such as tamoxifen, raloxifene, or exemestane.  She will think about this option, at this time she is consumed with the loss of her son.  We will discuss again at her next visit.          Plan:  -left diagnostic mammogram with tomosynthesis in 9/2021  ( after 9/17/21) at [facility] followed by clinic visit     I have advised the patient to continue monthly breast self examination and to notify us if she develops new breast symptoms.       FRIEDA Solis    I spent 45 minutes caring for Ms España on this date of service. This time includes time spent by me in the following activities: preparing for the visit, reviewing tests, performing a medically appropriate examination and/or evaluation , counseling and educating the patient/family/caregiver, ordering medications, tests, or procedures and documenting information in the medical record.      CC:  FRIEDA Jo    EMR Dragon/transcription disclaimer:    Much of this encounter note is an electronic transcription/translocation of spoken language to printed text.  The electronic translation of spoken language may permit erroneous, or at times, nonsensical words or phrases to be inadvertently transcribed.  Although I have reviewed the note from such areas, some may still exist.

## 2021-04-14 ENCOUNTER — OFFICE VISIT (OUTPATIENT)
Dept: SURGERY | Facility: CLINIC | Age: 76
End: 2021-04-14

## 2021-04-14 VITALS
SYSTOLIC BLOOD PRESSURE: 129 MMHG | HEIGHT: 64 IN | BODY MASS INDEX: 22.36 KG/M2 | HEART RATE: 91 BPM | DIASTOLIC BLOOD PRESSURE: 72 MMHG | WEIGHT: 131 LBS | OXYGEN SATURATION: 97 %

## 2021-04-14 DIAGNOSIS — Z80.3 FH: BREAST CANCER: ICD-10-CM

## 2021-04-14 DIAGNOSIS — R92.8 ABNORMAL FINDING ON BREAST IMAGING: Primary | ICD-10-CM

## 2021-04-14 DIAGNOSIS — N60.11 FIBROCYSTIC BREAST CHANGES, BILATERAL: ICD-10-CM

## 2021-04-14 DIAGNOSIS — N60.12 FIBROCYSTIC BREAST CHANGES, BILATERAL: ICD-10-CM

## 2021-04-14 PROCEDURE — 99214 OFFICE O/P EST MOD 30 MIN: CPT | Performed by: NURSE PRACTITIONER

## 2021-04-22 ENCOUNTER — LAB (OUTPATIENT)
Dept: LAB | Facility: HOSPITAL | Age: 76
End: 2021-04-22

## 2021-04-22 LAB
ALBUMIN SERPL-MCNC: 4.6 G/DL (ref 3.5–5.2)
ALBUMIN/GLOB SERPL: 1.8 G/DL
ALP SERPL-CCNC: 63 U/L (ref 39–117)
ALT SERPL W P-5'-P-CCNC: 19 U/L (ref 1–33)
ANION GAP SERPL CALCULATED.3IONS-SCNC: 9 MMOL/L (ref 5–15)
AST SERPL-CCNC: 22 U/L (ref 1–32)
BILIRUB SERPL-MCNC: 0.5 MG/DL (ref 0–1.2)
BUN SERPL-MCNC: 15 MG/DL (ref 8–23)
BUN/CREAT SERPL: 21.1 (ref 7–25)
CALCIUM SPEC-SCNC: 10 MG/DL (ref 8.6–10.5)
CHLORIDE SERPL-SCNC: 104 MMOL/L (ref 98–107)
CHOLEST SERPL-MCNC: 194 MG/DL (ref 0–200)
CO2 SERPL-SCNC: 28 MMOL/L (ref 22–29)
CREAT SERPL-MCNC: 0.71 MG/DL (ref 0.57–1)
GFR SERPL CREATININE-BSD FRML MDRD: 80 ML/MIN/1.73
GLOBULIN UR ELPH-MCNC: 2.6 GM/DL
GLUCOSE SERPL-MCNC: 93 MG/DL (ref 65–99)
HDLC SERPL QL: 3.46
HDLC SERPL-MCNC: 56 MG/DL (ref 40–60)
LDLC SERPL CALC-MCNC: 120 MG/DL (ref 0–100)
POTASSIUM SERPL-SCNC: 4.4 MMOL/L (ref 3.5–5.2)
PROT SERPL-MCNC: 7.2 G/DL (ref 6–8.5)
SODIUM SERPL-SCNC: 141 MMOL/L (ref 136–145)
TRIGL SERPL-MCNC: 101 MG/DL (ref 0–150)
VLDLC SERPL-MCNC: 18 MG/DL (ref 5–40)

## 2021-04-22 PROCEDURE — 80053 COMPREHEN METABOLIC PANEL: CPT | Performed by: NURSE PRACTITIONER

## 2021-04-22 PROCEDURE — 36415 COLL VENOUS BLD VENIPUNCTURE: CPT | Performed by: NURSE PRACTITIONER

## 2021-04-22 PROCEDURE — 80061 LIPID PANEL: CPT | Performed by: NURSE PRACTITIONER

## 2021-04-22 PROCEDURE — 86803 HEPATITIS C AB TEST: CPT | Performed by: NURSE PRACTITIONER

## 2021-09-20 ENCOUNTER — HOSPITAL ENCOUNTER (OUTPATIENT)
Dept: MAMMOGRAPHY | Facility: HOSPITAL | Age: 76
Discharge: HOME | End: 2021-09-20
Admitting: NURSE PRACTITIONER

## 2021-09-20 DIAGNOSIS — Z80.3 FH: BREAST CANCER: ICD-10-CM

## 2021-09-20 DIAGNOSIS — N60.12 FIBROCYSTIC BREAST CHANGES, BILATERAL: ICD-10-CM

## 2021-09-20 DIAGNOSIS — N60.11 FIBROCYSTIC BREAST CHANGES, BILATERAL: ICD-10-CM

## 2021-09-20 DIAGNOSIS — R92.8 ABNORMAL FINDING ON BREAST IMAGING: ICD-10-CM

## 2021-09-20 PROCEDURE — 77065 DX MAMMO INCL CAD UNI: CPT

## 2021-09-20 PROCEDURE — G0279 TOMOSYNTHESIS, MAMMO: HCPCS

## 2021-10-05 ENCOUNTER — OFFICE VISIT (OUTPATIENT)
Dept: SURGERY | Facility: CLINIC | Age: 76
End: 2021-10-05

## 2021-10-05 VITALS
OXYGEN SATURATION: 97 % | HEART RATE: 81 BPM | SYSTOLIC BLOOD PRESSURE: 112 MMHG | WEIGHT: 131 LBS | DIASTOLIC BLOOD PRESSURE: 75 MMHG | BODY MASS INDEX: 22.36 KG/M2 | HEIGHT: 64 IN

## 2021-10-05 DIAGNOSIS — N60.12 FIBROCYSTIC BREAST CHANGES, BILATERAL: ICD-10-CM

## 2021-10-05 DIAGNOSIS — Z80.3 FH: BREAST CANCER: ICD-10-CM

## 2021-10-05 DIAGNOSIS — R92.8 ABNORMAL FINDING ON BREAST IMAGING: Primary | ICD-10-CM

## 2021-10-05 DIAGNOSIS — N60.11 FIBROCYSTIC BREAST CHANGES, BILATERAL: ICD-10-CM

## 2021-10-05 PROCEDURE — 99213 OFFICE O/P EST LOW 20 MIN: CPT | Performed by: NURSE PRACTITIONER

## 2021-10-05 NOTE — PROGRESS NOTES
1. I see you indicated that your pain has been interfering with your mood. Is it causing sadness or depression? Yes    2. Ask only if 9B on the BPI is 8 or greater:  a.  Over the past 2 weeks, how many days have you had little interest or pleasure in doing things?  2-More than half the days  b.  Over the past 2 weeks how many days have you been feeling down, depressed or hopeless? 2-More than half the days       [facility]     Referring Provider: No ref. provider found     Chief complaint: abnormal breast imaging     HPI: Ms. Courtney España is a 74 yo woman, seen at the request of No ref. provider found, for abnormal left breast imaging.    I personally reviewed her records and summarized her relevant breast history/imaging:    She has no personal history of breast procedures         She has a family history of breast cancer in her mother diagnosed at age 85 with negative genetic testing, sister at age 50.   She denies any family history of ovarian cancer.  Her mother also had colon cancer at age 80.  There may also be a PA with breast cancer, uncertain of age.    2/5/2021:  Screening mammogram with tomosynthesis at [facility]  FINDINGS:  There are scattered areas of fibroglandular density.   There is an asymmetry in the outer posterior left breast on the MLO view. There are no suspicious masses, calcifications, or areas of architectural distortion in the right breast.   IMPRESSION/RECOMMENDATION(S):  Indeterminate left breast asymmetry. Recommend comparison with prior imaging, if available. If prior imaging is not available or if the finding is new, recommend further evaluation with CC spot compression and lateral views with Tomosynthesis and possible ultrasound.  No mammographic evidence of malignancy in the right breast.   BI-RADS Category 0: Incomplete - Need Additional Imaging Evaluation and/or Prior Mammograms for Comparison     3/14/2021:  Left diagnostic mammogram and left US at [facility]  Addendum: Prior mammograms dated 2/27/2019 and 11/15/2016 have become available for comparison.  The focal asymmetry in the upper outer posterior left breast is not significantly changed dating back to 2016 on the CC view and is slightly more conspicuous on the lateral view compared to prior MLO views, however, no comparison lateral view is available. This may be due to differences in patient positioning.  Additionally, no prior Tomosynthesis images are available for comparison. Given the long-term stability on the CC 2-D views, this is probably benign. Recommend short-term follow-up diagnostic left breast mammogram with Tomosynthesis in 6 months.   BI-RADS Category 3: Probably benign.     This report was finalized on 3/19/2021 4:35 PM by Dr. Stephanie Sanchez M.D.      Signed by Stephanie Sanchez MD on 3/19/2021  4:35 PM   Narrative & Impression   MAMMO DIAGNOSTIC DIGITAL TOMOSYNTHESIS LEFT W CAD-, US BREAST LEFT  LIMITED-     CLINICAL INDICATION: 75 years old woman recalled from annual screening mammogram from 2/5/2021 for evaluation of an asymmetry in the outer posterior left breast.   COMPARISON: Prior examinations dating back to 2/5/2021   FINDINGS:  MAMMOGRAM: Left CC spot compression and lateral 2-D and Tomosynthesis views were obtained.     There are scattered areas of fibroglandular density.    There is a persistent 0.9 cm irregular focal asymmetry in the upper outer posterior left breast. This area was further assessed with ultrasound.     ULTRASOUND:  Targeted sonographic evaluation of the left breast was performed from 1:00 to 2:00 in the region of the mammographic abnormality. No suspicious cystic or solid mass is identified.     IMPRESSION:  Suspicious 0.9 cm focal asymmetry in the upper outer posterior left breast without a sonographic correlate. Recommend further evaluation with stereotactic/Tomosynthesis guided core needle biopsy. The patient reports multiple prior mammograms from an outside institution in [place], most recently performed within the last year and a half. If prior imaging becomes available, an addendum will be issued to this report.   Findings and recommendations were discussed with the patient and her  in person at the time of her examination.     BI-RADS Category 4: Suspicious   This report was finalized on 3/17/2021 1:40 PM by Dr. Stephanie Sanchez M.D.     4/14/2021:  Today she  presents with no breast concerns.  She denies any breast lumps, pain, skin changes, or nipple discharge.  She denies any prior history of abnormal mammograms or breast biopsies.     She reports that she is otherwise healthy and does not take any medications.     She was joined today in clinic by her sister who participated in the discussion with her consent.     Of note, her son passed away this past weekend, she is visibly shaken by the loss.    10/5/21, Interval History:  She returns today for follow up with no breast complaints.  She in the process of moving permanently to [place].    Left diagnostic mammogram with tomosynthesis was completed 9/20/21 at [facility] with BiRAds 2 results. (see full report below)      Review of Systems   HENT:   Positive for hearing loss.    All other systems reviewed and are negative.      Medications:    Current Outpatient Medications:   •  Calcium Carbonate 500 MG chewable tablet, Chew 600 mg Daily., Disp: , Rfl:   •  cetirizine (zyrTEC) 10 MG tablet, Take 10 mg by mouth Daily., Disp: , Rfl:   •  Multiple Vitamin tablet, Take  by mouth., Disp: , Rfl:   •  Multiple Vitamins-Minerals (ZINC PO), Take 1 tablet by mouth Daily., Disp: , Rfl:   •  pravastatin (PRAVACHOL) 40 MG tablet, Take 1 tablet by mouth Every Night., Disp: 90 tablet, Rfl: 3  •  Scopolamine (TRANSDERM-SCOP, 1.5 MG,) 1.5 MG/3DAYS patch, Place 1 patch on the skin as directed by provider Every 72 (Seventy-Two) Hours., Disp: 4 each, Rfl: 0    Allergies:  No Known Allergies    Medical history:  Past Medical History:   Diagnosis Date   • Acute otitis media    • Diverticulosis 09/26/2019   • Encounter for screening colonoscopy     (Fiberoptic)   • Encounter for screening for human papillomavirus (HPV)    • Encounter for screening for osteoporosis    • Fibrocystic breast changes, bilateral 4/14/2021   • H/O Doppler echocardiogram    • Low back pain    • Osteopenia    • Prediabetes    • Visit for screening mammogram        Surgical  History:  Past Surgical History:   Procedure Laterality Date   • APPENDECTOMY     • COLONOSCOPY N/A 9/25/2019    Procedure: COLONOSCOPY;  Surgeon: Charlie Virgen MD;  Location: [facility] ENDOSCOPY;  Service: General       Family History:  Family History   Problem Relation Age of Onset   • Cancer Mother         Breast, Colon   • Breast cancer Mother    • Hypertension Father    • Cancer Father         Lung Cancer   • Cancer Sister         Breast   • Breast cancer Sister    • Breast cancer Paternal Aunt    • Breast cancer Paternal Aunt        Social History:   Social History     Socioeconomic History   • Marital status:      Spouse name: Not on file   • Number of children: Not on file   • Years of education: Not on file   • Highest education level: Not on file   Tobacco Use   • Smoking status: Never Smoker   • Smokeless tobacco: Never Used   Substance and Sexual Activity   • Alcohol use: Yes     Comment: 2-3 times a week   • Drug use: No   • Sexual activity: Defer     Patient drinks 2 servings of caffeine per day.       GYNECOLOGIC HISTORY:   G: 3. P: 3. AB: 0.  Last menstrual period: age 50  Age at menarche: 12  Age at first childbirth: 19  Lactation/How long: n/a  Age at menopause: 50  Total years of oral contraceptive use: 10  Total years of hormone replacement therapy: 10      Physical Exam  Vitals:    10/05/21 0913   BP: 112/75   Pulse: 81   SpO2: 97%      I reviewed physical exam, no changes noted    ECOG 0 - Asymptomatic  General: NAD, well appearing  Psych: a&o x 3, normal mood and affect  MSK: normal gait, normal ROM in bilateral shoulders  Lymph nodes:  no cervical, supraclavicular or axillary lymphadenopathy  Breast: asymmetric, left > right  Right:  No visible abnormalities on inspection while seated, with arms raised or hands on hips. No masses, skin changes, or nipple abnormalities.  Left:  No visible abnormalities on inspection while seated, with arms raised or hands on hips. No masses, skin  changes, or nipple abnormalities.    Imagin2021:  Left diagnostic mammogram with tomosynthesis at [facility]  FINDINGS:  MAMMOGRAM: Left CC and MLO 2-D and Tomosynthesis views were obtained.     There are scattered areas of fibroglandular density.    A focal asymmetry in the upper outer posterior left breast has become slightly less conspicuous over multiple prior mammograms, suggestive of a benign process. There are no new suspicious masses, calcifications, or  areas of architectural distortion.   IMPRESSION:  No mammographic evidence of malignancy in either breast. Recommend annual screening mammogram in 2022.     BI-RADS Category 2: Benign    Assessment:    1)  75 y.o. F with a new diagnosis of abnormal left breast imaging, (3/2021), stable 2021  2)  Fibrocystic breast changes  3)  Family history of breast cancer, lifetime risk 4.1% per tyrer cuzick model, 17.1% per mckinley model with a 5 year risk of 8.3%    Discussion:  1)  Imaging findings were reviewed with the patient, a copy was given.  No further surveillance is recommended, she can return to screening mammogram in 2022.    2)  We discussed fibrocystic change and how this is benign and can sometimes be associated with increased breast density. I reassured her today that she had a normal clinical breast exam and recent normal bilateral imaging. I explained that cords of dense breast tissue or focal areas of fibrocystic change can sometimes feel like a lump on exam. This is common in women like her with heterogeneous and nodular tissue. I encouraged her to become familiar with her own self-exam over the next few months to establish a personal baseline.    3)  Her risk for breast cancer was calculated based on personal and family history and found to be a lifetime risk 4.1% per tyrer cuzick model, 17.1% per mckinley model with a 5 year risk of 8.3%.  Neither of these models reflect high risk ( > 20% lifetime risk ) but that is likely due to her  current age of 75.  Her 5 year risk of 8.3% is significant.  We also discussed that for a Zainab model 5 year risk greater than 1.7% or LCIS, and a life expectancy > 10 years, that we recommend risk reduction counseling with the medical oncologists about chemopreventive agents such as tamoxifen, raloxifene, or exemestane.  She will think about this option, at this time she is consumed with the loss of her son.  We will discuss again at her next visit.          Plan:    In view of her normal imaging and examination I will not given her a follow-up in our office.  She will be due her screening mammogram in 2/2022.  I have asked her to check her self-exam and to call us in the interim with any concerns would be happy to see her back.    I have advised the patient to continue monthly breast self examination and to notify us if she develops new breast symptoms.       Shasha De León, FRIEDA      CC:  No ref. provider found    EMR Dragon/transcription disclaimer:  Dictated using Dragon dictation

## (undated) DEVICE — TUBING, SUCTION, 1/4" X 10', STRAIGHT: Brand: MEDLINE

## (undated) DEVICE — Device: Brand: DEFENDO AIR/WATER/SUCTION AND BIOPSY VALVE

## (undated) DEVICE — THE TORRENT IRRIGATION SCOPE CONNECTOR IS USED WITH THE TORRENT IRRIGATION TUBING TO PROVIDE IRRIGATION FLUIDS SUCH AS STERILE WATER DURING GASTROINTESTINAL ENDOSCOPIC PROCEDURES WHEN USED IN CONJUNCTION WITH AN IRRIGATION PUMP (OR ELECTROSURGICAL UNIT).: Brand: TORRENT

## (undated) DEVICE — CANN NASL CO2 TRULINK W/O2 A/

## (undated) DEVICE — SENSR O2 OXIMAX FNGR A/ 18IN NONSTR